# Patient Record
Sex: FEMALE | URBAN - METROPOLITAN AREA
[De-identification: names, ages, dates, MRNs, and addresses within clinical notes are randomized per-mention and may not be internally consistent; named-entity substitution may affect disease eponyms.]

---

## 2023-01-03 ENCOUNTER — OFFICE VISIT (OUTPATIENT)
Dept: URBAN - METROPOLITAN AREA CLINIC 43 | Facility: CLINIC | Age: 73
End: 2023-01-03
Payer: COMMERCIAL
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
## 2023-01-03 PROCEDURE — 99203 OFFICE O/P NEW LOW 30 MIN: CPT

## 2023-01-03 NOTE — IMPRESSION/PLAN
Impression: Contact lens associated with adverse incidents: Y77.11. Plan: Extensive examination did not reveal the presence of a contact lens in OD. DWP that sometimes scratching the eye can make it feel like something is in the eye. Pt was adamant that she had something in her eye. Explained that the fluorescein dye did not show any staining associated with a stuck contact lens. Reassured the patient that no contact lens was in her eye or behind her eye. RTC if symptoms no better.